# Patient Record
Sex: FEMALE | Race: WHITE | NOT HISPANIC OR LATINO | URBAN - METROPOLITAN AREA
[De-identification: names, ages, dates, MRNs, and addresses within clinical notes are randomized per-mention and may not be internally consistent; named-entity substitution may affect disease eponyms.]

---

## 2017-03-08 ENCOUNTER — IMPORTED ENCOUNTER (OUTPATIENT)
Dept: URBAN - METROPOLITAN AREA CLINIC 9 | Facility: CLINIC | Age: 82
End: 2017-03-08

## 2017-09-25 ENCOUNTER — IMPORTED ENCOUNTER (OUTPATIENT)
Dept: URBAN - METROPOLITAN AREA CLINIC 9 | Facility: CLINIC | Age: 82
End: 2017-09-25

## 2018-05-16 ENCOUNTER — IMPORTED ENCOUNTER (OUTPATIENT)
Dept: URBAN - METROPOLITAN AREA CLINIC 9 | Facility: CLINIC | Age: 83
End: 2018-05-16

## 2018-10-25 ENCOUNTER — IMPORTED ENCOUNTER (OUTPATIENT)
Dept: URBAN - METROPOLITAN AREA CLINIC 9 | Facility: CLINIC | Age: 83
End: 2018-10-25

## 2018-11-28 ENCOUNTER — IMPORTED ENCOUNTER (OUTPATIENT)
Dept: URBAN - METROPOLITAN AREA CLINIC 9 | Facility: CLINIC | Age: 83
End: 2018-11-28

## 2019-05-08 ENCOUNTER — IMPORTED ENCOUNTER (OUTPATIENT)
Dept: URBAN - METROPOLITAN AREA CLINIC 9 | Facility: CLINIC | Age: 84
End: 2019-05-08

## 2020-09-04 NOTE — PATIENT DISCUSSION
[facility] 36 point screen: I have reviewed the visual fields both taped and untaped on this patient which demonstrate significant obstruction of the patient's peripheral visual field on the right eye.

## 2020-09-21 ENCOUNTER — IMPORTED ENCOUNTER (OUTPATIENT)
Dept: URBAN - METROPOLITAN AREA CLINIC 9 | Facility: CLINIC | Age: 85
End: 2020-09-21

## 2020-10-22 NOTE — PATIENT DISCUSSION
Patient is here for suture removal.  The patient has a normal amount of bruising and swelling consistent with the post operative course. The suture lines are intact, there is no evidence of infection. The plan is to remove the sutures today with an expectation of normal healing. The post op course has been further reviewed with the patient. Sutures removed today without difficulty.

## 2020-10-22 NOTE — PATIENT DISCUSSION
Also, please do not hesitate to call us if you have any concerns not addressed by this information. Please call [phone] and we will do everything we can to help you during this period.

## 2021-02-01 ENCOUNTER — IMPORTED ENCOUNTER (OUTPATIENT)
Dept: URBAN - METROPOLITAN AREA CLINIC 9 | Facility: CLINIC | Age: 86
End: 2021-02-01

## 2021-09-08 ENCOUNTER — IMPORTED ENCOUNTER (OUTPATIENT)
Dept: URBAN - METROPOLITAN AREA CLINIC 9 | Facility: CLINIC | Age: 86
End: 2021-09-08

## 2021-09-08 PROBLEM — H43.813: Noted: 2021-09-08

## 2021-10-16 ASSESSMENT — VISUAL ACUITY
OD_CC: 20/30 -2 SN
OD_SC: 20/200 SN
OD_SC: 20/80 SN
OD_CC: 20/40 - SN
OS_CC: 20/30 SN
OD_PH: 20/40 SN
OS_CC: 20/40 - SN
OS_CC: 20/30 -2 SN
OD_CC: 20/30 - SN
OD_CC: 20/40 +2 SN
OS_CC: 20/30 - SN
OS_CC: 20/30 SN
OD_CC: 20/40 - SN
OD_CC: 20/40 - SN
OS_CC: 20/30 -2 SN
OD_CC: 20/25 -2 SN
OS_CC: 20/50 + SN
OS_CC: 20/30 SN
OS_CC: 20/40 -2 SN
OS_CC: 20/30 -2 SN
OD_CC: 20/40 SN
OD_CC: 20/30 - SN
OS_CC: 20/40 -2 SN
OD_CC: 20/40 - SN
OS_CC: 20/30 -2 SN
OS_SC: 20/70 - SN
OD_CC: 20/40 + SN
OD_CC: 20/50 + SN
OD_CC: 20/50 + SN
OS_SC: 20/80 SN

## 2021-10-16 ASSESSMENT — KERATOMETRY
OS_K2POWER_DIOPTERS: 47
OD_K1POWER_DIOPTERS: 46.25
OS_AXISANGLE_DEGREES: 62
OD_AXISANGLE2_DEGREES: 35
OS_K2POWER_DIOPTERS: 47
OD_AXISANGLE_DEGREES: 125
OS_K1POWER_DIOPTERS: 46.5
OS_AXISANGLE2_DEGREES: 152
OD_AXISANGLE_DEGREES: 143
OD_AXISANGLE2_DEGREES: 53
OS_K2POWER_DIOPTERS: 47.25
OS_AXISANGLE_DEGREES: 84
OS_K1POWER_DIOPTERS: 46.75
OD_K1POWER_DIOPTERS: 46.5
OD_K1POWER_DIOPTERS: 46.25
OS_AXISANGLE2_DEGREES: 174
OS_AXISANGLE2_DEGREES: 166
OD_K2POWER_DIOPTERS: 47
OD_AXISANGLE2_DEGREES: 49
OD_AXISANGLE_DEGREES: 139
OD_K2POWER_DIOPTERS: 47
OS_K1POWER_DIOPTERS: 46.25
OS_AXISANGLE_DEGREES: 76
OD_K2POWER_DIOPTERS: 47

## 2021-10-16 ASSESSMENT — TONOMETRY
OS_IOP_MMHG: 15
OS_IOP_MMHG: 15
OD_IOP_MMHG: 14
OS_IOP_MMHG: 13
OS_IOP_MMHG: 15
OD_IOP_MMHG: 14
OD_IOP_MMHG: 13
OD_IOP_MMHG: 15
OS_IOP_MMHG: 15
OD_IOP_MMHG: 14
OD_IOP_MMHG: 11
OD_IOP_MMHG: 14
OS_IOP_MMHG: 13
OS_IOP_MMHG: 13
OS_IOP_MMHG: 17
OS_IOP_MMHG: 14

## 2022-04-04 ENCOUNTER — ESTABLISHED PATIENT (OUTPATIENT)
Dept: URBAN - METROPOLITAN AREA CLINIC 9 | Facility: CLINIC | Age: 87
End: 2022-04-04

## 2022-04-04 DIAGNOSIS — H43.813: ICD-10-CM

## 2022-04-04 DIAGNOSIS — H52.13: ICD-10-CM

## 2022-04-04 DIAGNOSIS — H35.3131: ICD-10-CM

## 2022-04-04 PROCEDURE — 92014 COMPRE OPH EXAM EST PT 1/>: CPT

## 2022-04-04 PROCEDURE — 92015 DETERMINE REFRACTIVE STATE: CPT

## 2022-04-04 PROCEDURE — 92134 CPTRZ OPH DX IMG PST SGM RTA: CPT

## 2022-04-04 ASSESSMENT — TONOMETRY
OS_IOP_MMHG: 10
OD_IOP_MMHG: 10

## 2022-04-04 ASSESSMENT — VISUAL ACUITY
OS_CC: 20/80+1
OD_CC: 20/30-2

## 2022-08-24 ENCOUNTER — ESTABLISHED PATIENT (OUTPATIENT)
Dept: URBAN - METROPOLITAN AREA CLINIC 9 | Facility: CLINIC | Age: 87
End: 2022-08-24

## 2022-08-24 DIAGNOSIS — H35.3122: ICD-10-CM

## 2022-08-24 DIAGNOSIS — H43.813: ICD-10-CM

## 2022-08-24 DIAGNOSIS — H35.033: ICD-10-CM

## 2022-08-24 DIAGNOSIS — H35.3211: ICD-10-CM

## 2022-08-24 PROCEDURE — 92134 CPTRZ OPH DX IMG PST SGM RTA: CPT

## 2022-08-24 PROCEDURE — 92014 COMPRE OPH EXAM EST PT 1/>: CPT

## 2022-08-24 PROCEDURE — 67028 INJECTION EYE DRUG: CPT

## 2022-08-24 ASSESSMENT — VISUAL ACUITY
OD_SC: 20/400
OD_PH: 20/200
OS_SC: 20/50+1

## 2022-08-24 ASSESSMENT — TONOMETRY
OS_IOP_MMHG: 13
OD_IOP_MMHG: 10

## 2022-09-21 ENCOUNTER — CLINIC PROCEDURE ONLY (OUTPATIENT)
Dept: URBAN - METROPOLITAN AREA CLINIC 9 | Facility: CLINIC | Age: 87
End: 2022-09-21

## 2022-09-21 DIAGNOSIS — H35.3122: ICD-10-CM

## 2022-09-21 DIAGNOSIS — H35.3211: ICD-10-CM

## 2022-09-21 PROCEDURE — 67028 INJECTION EYE DRUG: CPT

## 2022-09-21 PROCEDURE — 92134 CPTRZ OPH DX IMG PST SGM RTA: CPT

## 2022-09-21 ASSESSMENT — VISUAL ACUITY
OD_CC: 20/60-1
OS_CC: 20/30-2

## 2022-09-21 ASSESSMENT — TONOMETRY
OD_IOP_MMHG: 18
OS_IOP_MMHG: 18

## 2022-10-19 ENCOUNTER — CLINIC PROCEDURE ONLY (OUTPATIENT)
Dept: URBAN - METROPOLITAN AREA CLINIC 9 | Facility: CLINIC | Age: 87
End: 2022-10-19

## 2022-10-19 DIAGNOSIS — H35.3211: ICD-10-CM

## 2022-10-19 DIAGNOSIS — H35.3122: ICD-10-CM

## 2022-10-19 PROCEDURE — 92134 CPTRZ OPH DX IMG PST SGM RTA: CPT

## 2022-10-19 PROCEDURE — 67028 INJECTION EYE DRUG: CPT

## 2022-10-19 ASSESSMENT — TONOMETRY
OS_IOP_MMHG: 18
OD_IOP_MMHG: 16

## 2022-10-19 ASSESSMENT — VISUAL ACUITY
OD_CC: 20/50+1
OS_CC: 20/30-1

## 2022-11-16 ENCOUNTER — CLINIC PROCEDURE ONLY (OUTPATIENT)
Dept: URBAN - METROPOLITAN AREA CLINIC 9 | Facility: CLINIC | Age: 87
End: 2022-11-16

## 2022-11-16 DIAGNOSIS — H35.3211: ICD-10-CM

## 2022-11-16 DIAGNOSIS — H35.3122: ICD-10-CM

## 2022-11-16 PROCEDURE — 92134 CPTRZ OPH DX IMG PST SGM RTA: CPT

## 2022-11-16 PROCEDURE — 67028 INJECTION EYE DRUG: CPT

## 2022-11-16 ASSESSMENT — VISUAL ACUITY
OD_CC: 20/50
OS_CC: 20/40-2

## 2022-11-16 ASSESSMENT — TONOMETRY
OS_IOP_MMHG: 18
OD_IOP_MMHG: 17

## 2022-12-14 ENCOUNTER — CLINIC PROCEDURE ONLY (OUTPATIENT)
Dept: URBAN - METROPOLITAN AREA CLINIC 9 | Facility: CLINIC | Age: 87
End: 2022-12-14

## 2022-12-14 PROCEDURE — 92134 CPTRZ OPH DX IMG PST SGM RTA: CPT

## 2022-12-14 PROCEDURE — 67028 INJECTION EYE DRUG: CPT

## 2022-12-14 ASSESSMENT — VISUAL ACUITY
OS_CC: 20/40-2
OD_CC: 20/50-1

## 2022-12-14 ASSESSMENT — TONOMETRY
OD_IOP_MMHG: 18
OS_IOP_MMHG: 18

## 2022-12-22 ENCOUNTER — DIAGNOSTICS ONLY (OUTPATIENT)
Dept: URBAN - METROPOLITAN AREA CLINIC 14 | Facility: CLINIC | Age: 87
End: 2022-12-22

## 2022-12-22 PROCEDURE — 92250 FUNDUS PHOTOGRAPHY W/I&R: CPT

## 2022-12-22 PROCEDURE — 92235 FLUORESCEIN ANGRPH MLTIFRAME: CPT

## 2023-01-25 ENCOUNTER — ESTABLISHED PATIENT (OUTPATIENT)
Dept: URBAN - METROPOLITAN AREA CLINIC 9 | Facility: CLINIC | Age: 88
End: 2023-01-25

## 2023-01-25 DIAGNOSIS — H35.3211: ICD-10-CM

## 2023-01-25 DIAGNOSIS — H35.3122: ICD-10-CM

## 2023-01-25 DIAGNOSIS — H35.033: ICD-10-CM

## 2023-01-25 DIAGNOSIS — H43.813: ICD-10-CM

## 2023-01-25 PROCEDURE — 92134 CPTRZ OPH DX IMG PST SGM RTA: CPT

## 2023-01-25 PROCEDURE — 67028 INJECTION EYE DRUG: CPT

## 2023-01-25 PROCEDURE — 92014 COMPRE OPH EXAM EST PT 1/>: CPT

## 2023-01-25 ASSESSMENT — TONOMETRY
OS_IOP_MMHG: 20
OD_IOP_MMHG: 18

## 2023-01-25 ASSESSMENT — VISUAL ACUITY
OD_CC: 20/100-1
OS_CC: 20/30-2
OD_PH: 20/80

## 2023-04-12 ENCOUNTER — ESTABLISHED PATIENT (OUTPATIENT)
Dept: URBAN - METROPOLITAN AREA CLINIC 9 | Facility: CLINIC | Age: 88
End: 2023-04-12

## 2023-04-12 DIAGNOSIS — H35.3211: ICD-10-CM

## 2023-04-12 DIAGNOSIS — H52.13: ICD-10-CM

## 2023-04-12 PROCEDURE — 92015 DETERMINE REFRACTIVE STATE: CPT

## 2023-04-12 PROCEDURE — 92014 COMPRE OPH EXAM EST PT 1/>: CPT

## 2023-04-12 ASSESSMENT — KERATOMETRY
OD_AXISANGLE2_DEGREES: 31
OD_K1POWER_DIOPTERS: 46.50
OD_AXISANGLE_DEGREES: 121
OS_AXISANGLE_DEGREES: 83
OS_AXISANGLE2_DEGREES: 173
OD_K2POWER_DIOPTERS: 47.50
OS_K1POWER_DIOPTERS: 46.25
OS_K2POWER_DIOPTERS: 47.25

## 2023-04-12 ASSESSMENT — VISUAL ACUITY
OU_CC: 20/30
OU_SC: 20/40
OS_SC: 20/40
OS_CC: 20/30
OD_CC: 20/50
OD_SC: 20/70

## 2023-04-12 ASSESSMENT — TONOMETRY
OD_IOP_MMHG: 18
OS_IOP_MMHG: 18

## 2023-06-28 ENCOUNTER — ESTABLISHED PATIENT (OUTPATIENT)
Dept: URBAN - METROPOLITAN AREA CLINIC 9 | Facility: CLINIC | Age: 88
End: 2023-06-28

## 2023-06-28 DIAGNOSIS — H35.3211: ICD-10-CM

## 2023-06-28 DIAGNOSIS — H35.033: ICD-10-CM

## 2023-06-28 DIAGNOSIS — H43.813: ICD-10-CM

## 2023-06-28 DIAGNOSIS — H35.3122: ICD-10-CM

## 2023-06-28 PROCEDURE — 92014 COMPRE OPH EXAM EST PT 1/>: CPT

## 2023-06-28 PROCEDURE — 92134 CPTRZ OPH DX IMG PST SGM RTA: CPT

## 2023-06-28 PROCEDURE — 67028 INJECTION EYE DRUG: CPT

## 2023-06-28 ASSESSMENT — TONOMETRY
OD_IOP_MMHG: 18
OS_IOP_MMHG: 22

## 2023-06-28 ASSESSMENT — VISUAL ACUITY
OD_SC: 20/70+2
OS_SC: 20/40+2

## 2023-08-23 ENCOUNTER — CLINIC PROCEDURE ONLY (OUTPATIENT)
Dept: URBAN - METROPOLITAN AREA CLINIC 9 | Facility: CLINIC | Age: 88
End: 2023-08-23

## 2023-08-23 DIAGNOSIS — H35.3211: ICD-10-CM

## 2023-08-23 DIAGNOSIS — H35.3122: ICD-10-CM

## 2023-08-23 PROCEDURE — 92134 CPTRZ OPH DX IMG PST SGM RTA: CPT

## 2023-08-23 PROCEDURE — 67028 INJECTION EYE DRUG: CPT

## 2023-08-23 ASSESSMENT — VISUAL ACUITY
OD_CC: 20/60-1
OS_CC: 20/30-1

## 2023-08-23 ASSESSMENT — TONOMETRY
OD_IOP_MMHG: 18
OS_IOP_MMHG: 16

## 2023-10-18 ENCOUNTER — ESTABLISHED PATIENT (OUTPATIENT)
Facility: LOCATION | Age: 88
End: 2023-10-18

## 2023-10-18 DIAGNOSIS — H35.3122: ICD-10-CM

## 2023-10-18 DIAGNOSIS — H43.813: ICD-10-CM

## 2023-10-18 DIAGNOSIS — H35.3211: ICD-10-CM

## 2023-10-18 DIAGNOSIS — H04.123: ICD-10-CM

## 2023-10-18 DIAGNOSIS — H35.033: ICD-10-CM

## 2023-10-18 PROCEDURE — 67028 INJECTION EYE DRUG: CPT

## 2023-10-18 PROCEDURE — 92134 CPTRZ OPH DX IMG PST SGM RTA: CPT

## 2023-10-18 PROCEDURE — 99214 OFFICE O/P EST MOD 30 MIN: CPT | Mod: 25

## 2023-10-18 ASSESSMENT — VISUAL ACUITY
OD_CC: 20/60-1
OS_CC: 20/40+1

## 2023-10-18 ASSESSMENT — TONOMETRY
OS_IOP_MMHG: 17
OD_IOP_MMHG: 18

## 2023-11-29 ENCOUNTER — CLINIC PROCEDURE ONLY (OUTPATIENT)
Facility: LOCATION | Age: 88
End: 2023-11-29

## 2023-11-29 DIAGNOSIS — H35.3211: ICD-10-CM

## 2023-11-29 DIAGNOSIS — H35.3122: ICD-10-CM

## 2023-11-29 PROCEDURE — 92134 CPTRZ OPH DX IMG PST SGM RTA: CPT

## 2023-11-29 PROCEDURE — 67028 INJECTION EYE DRUG: CPT

## 2023-11-29 ASSESSMENT — VISUAL ACUITY
OD_SC: 20/50-2
OS_SC: 20/30-2

## 2023-11-29 ASSESSMENT — TONOMETRY
OS_IOP_MMHG: 24
OD_IOP_MMHG: 18

## 2024-01-29 ENCOUNTER — CLINIC PROCEDURE ONLY (OUTPATIENT)
Dept: URBAN - METROPOLITAN AREA CLINIC 14 | Facility: CLINIC | Age: 89
End: 2024-01-29

## 2024-01-29 DIAGNOSIS — H35.3211: ICD-10-CM

## 2024-01-29 DIAGNOSIS — H35.3122: ICD-10-CM

## 2024-01-29 PROCEDURE — 67028 INJECTION EYE DRUG: CPT

## 2024-01-29 PROCEDURE — 92134 CPTRZ OPH DX IMG PST SGM RTA: CPT

## 2024-01-29 ASSESSMENT — TONOMETRY
OD_IOP_MMHG: 16
OS_IOP_MMHG: 13

## 2024-01-29 ASSESSMENT — VISUAL ACUITY
OS_CC: 20/30+1
OD_CC: 20/40-2

## 2024-04-15 ENCOUNTER — ESTABLISHED PATIENT (OUTPATIENT)
Dept: URBAN - METROPOLITAN AREA CLINIC 14 | Facility: CLINIC | Age: 89
End: 2024-04-15

## 2024-04-15 DIAGNOSIS — H43.813: ICD-10-CM

## 2024-04-15 DIAGNOSIS — H35.3122: ICD-10-CM

## 2024-04-15 DIAGNOSIS — H04.123: ICD-10-CM

## 2024-04-15 DIAGNOSIS — H35.033: ICD-10-CM

## 2024-04-15 DIAGNOSIS — H35.3211: ICD-10-CM

## 2024-04-15 PROCEDURE — 67028 INJECTION EYE DRUG: CPT

## 2024-04-15 PROCEDURE — 92134 CPTRZ OPH DX IMG PST SGM RTA: CPT

## 2024-04-15 PROCEDURE — 99214 OFFICE O/P EST MOD 30 MIN: CPT | Mod: 25

## 2024-04-15 ASSESSMENT — TONOMETRY
OS_IOP_MMHG: 10
OD_IOP_MMHG: 10

## 2024-04-15 ASSESSMENT — VISUAL ACUITY
OS_CC: 20/30+1
OD_CC: 20/50

## 2024-05-23 ENCOUNTER — CLINIC PROCEDURE ONLY (OUTPATIENT)
Dept: URBAN - METROPOLITAN AREA CLINIC 14 | Facility: CLINIC | Age: 89
End: 2024-05-23

## 2024-05-23 DIAGNOSIS — H35.3211: ICD-10-CM

## 2024-05-23 DIAGNOSIS — H35.3122: ICD-10-CM

## 2024-05-23 PROCEDURE — 92134 CPTRZ OPH DX IMG PST SGM RTA: CPT

## 2024-05-23 PROCEDURE — 67028 INJECTION EYE DRUG: CPT

## 2024-05-23 ASSESSMENT — TONOMETRY
OS_IOP_MMHG: 22
OD_IOP_MMHG: 18

## 2024-05-23 ASSESSMENT — VISUAL ACUITY
OS_SC: 20/40+1
OD_SC: 20/50

## 2024-07-08 ENCOUNTER — CLINIC PROCEDURE ONLY (OUTPATIENT)
Dept: URBAN - METROPOLITAN AREA CLINIC 14 | Facility: CLINIC | Age: 89
End: 2024-07-08

## 2024-07-08 DIAGNOSIS — H35.3211: ICD-10-CM

## 2024-07-08 DIAGNOSIS — H35.3122: ICD-10-CM

## 2024-07-08 PROCEDURE — 67028 INJECTION EYE DRUG: CPT

## 2024-07-08 PROCEDURE — 92134 CPTRZ OPH DX IMG PST SGM RTA: CPT

## 2024-07-08 ASSESSMENT — VISUAL ACUITY
OD_SC: 20/50+1
OS_SC: 20/40+1

## 2024-07-08 ASSESSMENT — TONOMETRY
OD_IOP_MMHG: 19
OS_IOP_MMHG: 17